# Patient Record
Sex: FEMALE | Race: BLACK OR AFRICAN AMERICAN | NOT HISPANIC OR LATINO
[De-identification: names, ages, dates, MRNs, and addresses within clinical notes are randomized per-mention and may not be internally consistent; named-entity substitution may affect disease eponyms.]

---
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
## 2018-02-15 ENCOUNTER — APPOINTMENT (OUTPATIENT)
Dept: PEDIATRIC ADOLESCENT MEDICINE | Facility: HOSPITAL | Age: 18
End: 2018-02-15

## 2018-02-15 VITALS — SYSTOLIC BLOOD PRESSURE: 117 MMHG | HEART RATE: 63 BPM | WEIGHT: 120 LBS | DIASTOLIC BLOOD PRESSURE: 73 MMHG

## 2018-02-15 DIAGNOSIS — Z11.3 ENCOUNTER FOR SCREENING FOR INFECTIONS WITH A PREDOMINANTLY SEXUAL MODE OF TRANSMISSION: ICD-10-CM

## 2018-02-15 DIAGNOSIS — Z30.09 ENCOUNTER FOR OTHER GENERAL COUNSELING AND ADVICE ON CONTRACEPTION: ICD-10-CM

## 2018-02-15 DIAGNOSIS — Z80.0 FAMILY HISTORY OF MALIGNANT NEOPLASM OF DIGESTIVE ORGANS: ICD-10-CM

## 2018-02-15 DIAGNOSIS — Z83.49 FAMILY HISTORY OF OTHER ENDOCRINE, NUTRITIONAL AND METABOLIC DISEASES: ICD-10-CM

## 2018-02-15 RX ORDER — NORGESTIMATE AND ETHINYL ESTRADIOL 7DAYSX3 28
0.18/0.215/0.25 KIT ORAL DAILY
Refills: 0 | Status: ACTIVE | COMMUNITY
Start: 2018-02-15

## 2018-03-15 ENCOUNTER — APPOINTMENT (OUTPATIENT)
Dept: DERMATOLOGY | Facility: CLINIC | Age: 18
End: 2018-03-15
Payer: OTHER GOVERNMENT

## 2018-03-15 VITALS — SYSTOLIC BLOOD PRESSURE: 106 MMHG | DIASTOLIC BLOOD PRESSURE: 70 MMHG

## 2018-03-15 PROCEDURE — 99213 OFFICE O/P EST LOW 20 MIN: CPT

## 2018-06-14 ENCOUNTER — APPOINTMENT (OUTPATIENT)
Dept: DERMATOLOGY | Facility: CLINIC | Age: 18
End: 2018-06-14
Payer: OTHER GOVERNMENT

## 2018-06-14 VITALS — DIASTOLIC BLOOD PRESSURE: 72 MMHG | SYSTOLIC BLOOD PRESSURE: 106 MMHG

## 2018-06-14 DIAGNOSIS — L81.0 POSTINFLAMMATORY HYPERPIGMENTATION: ICD-10-CM

## 2018-06-14 DIAGNOSIS — R23.8 OTHER SKIN CHANGES: ICD-10-CM

## 2018-06-14 DIAGNOSIS — L70.0 ACNE VULGARIS: ICD-10-CM

## 2018-06-14 PROCEDURE — 99213 OFFICE O/P EST LOW 20 MIN: CPT

## 2018-08-02 ENCOUNTER — APPOINTMENT (OUTPATIENT)
Dept: DERMATOLOGY | Facility: CLINIC | Age: 18
End: 2018-08-02
Payer: OTHER GOVERNMENT

## 2018-08-02 DIAGNOSIS — L30.0 NUMMULAR DERMATITIS: ICD-10-CM

## 2018-08-02 DIAGNOSIS — L98.9 DISORDER OF THE SKIN AND SUBCUTANEOUS TISSUE, UNSPECIFIED: ICD-10-CM

## 2018-08-02 PROCEDURE — 99213 OFFICE O/P EST LOW 20 MIN: CPT

## 2018-08-02 RX ORDER — MUPIROCIN 20 MG/G
2 OINTMENT TOPICAL
Qty: 1 | Refills: 2 | Status: ACTIVE | COMMUNITY
Start: 2018-08-02 | End: 1900-01-01

## 2018-08-02 RX ORDER — HALOBETASOL PROPIONATE 0.5 MG/G
0.05 OINTMENT TOPICAL
Qty: 1 | Refills: 0 | Status: ACTIVE | COMMUNITY
Start: 2018-08-02 | End: 1900-01-01

## 2018-08-27 ENCOUNTER — LABORATORY RESULT (OUTPATIENT)
Age: 18
End: 2018-08-27

## 2018-08-28 ENCOUNTER — APPOINTMENT (OUTPATIENT)
Dept: DERMATOLOGY | Facility: CLINIC | Age: 18
End: 2018-08-28
Payer: OTHER GOVERNMENT

## 2018-08-28 VITALS — DIASTOLIC BLOOD PRESSURE: 62 MMHG | SYSTOLIC BLOOD PRESSURE: 100 MMHG

## 2018-08-28 DIAGNOSIS — R22.9 LOCALIZED SWELLING, MASS AND LUMP, UNSPECIFIED: ICD-10-CM

## 2018-08-28 DIAGNOSIS — L30.9 DERMATITIS, UNSPECIFIED: ICD-10-CM

## 2018-08-28 PROCEDURE — 99213 OFFICE O/P EST LOW 20 MIN: CPT

## 2018-08-28 RX ORDER — KETOCONAZOLE 20 MG/G
2 CREAM TOPICAL
Qty: 1 | Refills: 0 | Status: ACTIVE | COMMUNITY
Start: 2018-08-28 | End: 1900-01-01

## 2018-08-29 PROBLEM — R22.9 SUBCUTANEOUS NODULE: Status: ACTIVE | Noted: 2018-08-28

## 2018-08-29 PROBLEM — L30.9 DERMATITIS: Status: ACTIVE | Noted: 2017-11-30

## 2020-10-15 ENCOUNTER — EMERGENCY (EMERGENCY)
Facility: HOSPITAL | Age: 20
LOS: 1 days | Discharge: HOME | End: 2020-10-15
Attending: STUDENT IN AN ORGANIZED HEALTH CARE EDUCATION/TRAINING PROGRAM | Admitting: STUDENT IN AN ORGANIZED HEALTH CARE EDUCATION/TRAINING PROGRAM
Payer: OTHER GOVERNMENT

## 2020-10-15 VITALS
RESPIRATION RATE: 18 BRPM | WEIGHT: 130.07 LBS | DIASTOLIC BLOOD PRESSURE: 76 MMHG | HEIGHT: 64 IN | HEART RATE: 81 BPM | TEMPERATURE: 98 F | OXYGEN SATURATION: 98 % | SYSTOLIC BLOOD PRESSURE: 115 MMHG

## 2020-10-15 VITALS
RESPIRATION RATE: 18 BRPM | SYSTOLIC BLOOD PRESSURE: 137 MMHG | OXYGEN SATURATION: 100 % | TEMPERATURE: 98 F | DIASTOLIC BLOOD PRESSURE: 86 MMHG | HEART RATE: 64 BPM

## 2020-10-15 PROCEDURE — 99283 EMERGENCY DEPT VISIT LOW MDM: CPT

## 2020-10-15 PROCEDURE — 99284 EMERGENCY DEPT VISIT MOD MDM: CPT

## 2020-10-15 NOTE — ED PROVIDER NOTE - PROGRESS NOTE DETAILS
Angela PGY2: Patient would not like drainage at this time and would rather f/u outpatient. Given f/u clinic contact by OBGYN, discussed return precautions with patient.

## 2020-10-15 NOTE — ED PROVIDER NOTE - NSFOLLOWUPINSTRUCTIONS_ED_ALL_ED_FT
- Continue all regular medications  - For pain, take tylenol or ibuprofen as directed on the packaging  - Follow up wit an OBGYN ([phone]) within 3 days  - You were given copies of labs and/or imaging results if applicable, please take them to your follow up appointments  - Return to the ER for unbearable pain, severe swelling at the site, fever, vomiting, abdominal pain or any worsening symptoms or concerns

## 2020-10-15 NOTE — ED ADULT NURSE NOTE - OBJECTIVE STATEMENT
20 year old female with a Hx of a Bartholin Cyst presents to the ED complaining of a new cyst on the right labia and a brief period of nausea this morning that resolved after eating breakfast. Pt is A&O x 4, VSS, afebrile, ambulating independently. Pt denies fever, chills, NVD, SOB, or chest pain. Pt well appearing. Vaginal exam performed with RN present throughout.

## 2020-10-15 NOTE — ED PROVIDER NOTE - PHYSICAL EXAMINATION
Physical Exam:  Gen: NAD, AOx3, non-toxic appearing, able to ambulate without assistance  Lung: no respiratory distress, speaking in full sentences  CV: RRR, no murmurs, rubs or gallops, distal pulses 2+ b/l  Abd: soft, NT, ND, no guarding, no rigidity, no rebound tenderness, no CVA tenderness   : chaperoned by Zaida SUAZO, minimal R inner labial swelling and TTP, no erythema or discharge or active bleeding  Skin: Warm, well perfused, no rash, no leg swelling  Psych: normal affect, calm

## 2020-10-15 NOTE — ED PROVIDER NOTE - ATTENDING CONTRIBUTION TO CARE
I have personally performed a face to face medical and diagnostic evaluation of the patient. I have discussed with and reviewed the Resident's note and agree with the History, ROS, Physical Exam and MDM unless otherwise indicated. A brief summary of my personal evaluation and impression can be found below.    20F prior hx of R bartholin cyst s/p repair, now presents with pain to L vaginal wall c/w prior episodes of cyst on opposite site, per triage note c/o abdominal pain a/w n/v however pt reports that she does not have abdominal pain, did have mild nausea this morning that improved after breakfast, tolerating PO otherwise, no new vaginal discharge, no rash, no fevers, currently on menstrual cycle. No meds prior to ED arrival, is at [facility], clinic was closed today due to power loss. Otherwise without complaints. Denies n//f/c/cp/sob. Denies headache, syncope, lightheadedness, dizziness. Denies chest palpitations, abdominal pain. Denies edema. Denies dysuria, hematuria, BRBPR, tarry stools, diarrhea, constipation.     All other ROS negative, except as above and as per HPI and ROS section.    VITALS: Initial triage and subsequent vitals have been reviewed by me.  GEN APPEARANCE: WDWN, alert, non-toxic, NAD  HEAD: Atraumatic.  EYES: PERRLa, EOMI, vision grossly intact.   NECK: Supple  CV: RRR, S1S2, no c/r/m/g. Cap refill <2 seconds. No bruits.   LUNGS: CTAB. No abnormal breath sounds.  ABDOMEN: Soft, NTND. No guarding or rebound.   MSK/EXT: No spinal or extremity point tenderness. No CVA ttp. Pelvis stable. No peripheral edema.  NEURO: Alert, follows commands. Weight bearing normal. Speech normal. Sensation and motor normal x4 extremities.   SKIN: Warm, dry and intact. No rash.  PSYCH: Appropriate   Exam (Female): External genitalia normal, small ~1-2cm medial R labial wall swelling, minimal fluctuance? no erythremia no purulent drainage, minimal ttp , no e/o bleeding, trauma.   EXAM WITH: Angela SANTIZO, Frannie SUAZO      Plan/MDM: 20F presents with labial wall swelling and discomfort c/w prior episodes of L bartholin cyst, this episode on R side, exam as above, area very small offered drainage however so small unlikely w significant benefit, had r/b/a discussion with pt, pt prefers to follow up in OBGYN outpt office for further management, low c/f abscess as w minimal ttp, no redness, no fevers, no abdominal pain, no other systemic sx. w dc w close obgyn follow up, strict return precautions discussed.

## 2020-10-15 NOTE — ED PROVIDER NOTE - CLINICAL SUMMARY MEDICAL DECISION MAKING FREE TEXT BOX
19yo female p/w R inner labial pain. Minimal swelling and TTP at the site. Normal vitals, afebrile. No signs of active infection or STI. Likely non-infected bartholin gland cyst. Drainage with word catheter and dc with OBGYN f/u.

## 2020-10-15 NOTE — ED PROVIDER NOTE - PATIENT PORTAL LINK FT
You can access the FollowMyHealth Patient Portal offered by [facility] by registering at the following website: [email or web address]. By joining [employer]’s FollowMyHealth portal, you will also be able to view your health information using other applications (apps) compatible with our system.

## 2020-10-16 ENCOUNTER — EMERGENCY (EMERGENCY)
Facility: HOSPITAL | Age: 20
LOS: 1 days | Discharge: HOME | End: 2020-10-16
Attending: STUDENT IN AN ORGANIZED HEALTH CARE EDUCATION/TRAINING PROGRAM
Payer: OTHER GOVERNMENT

## 2020-10-16 VITALS
RESPIRATION RATE: 20 BRPM | HEART RATE: 64 BPM | OXYGEN SATURATION: 100 % | DIASTOLIC BLOOD PRESSURE: 81 MMHG | SYSTOLIC BLOOD PRESSURE: 121 MMHG

## 2020-10-16 VITALS
OXYGEN SATURATION: 98 % | SYSTOLIC BLOOD PRESSURE: 113 MMHG | DIASTOLIC BLOOD PRESSURE: 72 MMHG | RESPIRATION RATE: 18 BRPM | HEART RATE: 71 BPM | WEIGHT: 126.99 LBS | TEMPERATURE: 98 F | HEIGHT: 64 IN

## 2020-10-16 DIAGNOSIS — N75.0 CYST OF BARTHOLIN'S GLAND: ICD-10-CM

## 2020-10-16 PROCEDURE — 56420 I&D BARTHOLINS GLAND ABSCESS: CPT | Mod: RT

## 2020-10-16 PROCEDURE — 10060 I&D ABSCESS SIMPLE/SINGLE: CPT | Mod: GC

## 2020-10-16 PROCEDURE — 99284 EMERGENCY DEPT VISIT MOD MDM: CPT | Mod: 25

## 2020-10-16 PROCEDURE — 99284 EMERGENCY DEPT VISIT MOD MDM: CPT

## 2020-10-16 NOTE — ED PROVIDER NOTE - PATIENT PORTAL LINK FT
You can access the FollowMyHealth Patient Portal offered by [facility] by registering at the following website: [email or web address]. By joining [employer]’s FollowMyHealth portal, you will also be able to view your health information using other applications (apps) compatible with our system.

## 2020-10-16 NOTE — ED ADULT TRIAGE NOTE - CHIEF COMPLAINT QUOTE
pt seen in er yesterday for complaint returns from [facility] for same complaint

## 2020-10-16 NOTE — ED PROVIDER NOTE - OBJECTIVE STATEMENT
20F with hx of left sided bartholin cyst s/p surgical repair now p/w worsening right sided vaginal pain and swelling.  Pt was seen here yesterday for same and the pain and swelling is worse. 20F with hx of left sided bartholin cyst s/p surgical repair now p/w worsening right sided vaginal pain and swelling.  Pt was seen here yesterday for same and the pain and swelling is worse. denies fevers, chills, skin erythema.

## 2020-10-16 NOTE — ED PROVIDER NOTE - NSFOLLOWUPINSTRUCTIONS_ED_ALL_ED_FT
Thank you for visiting our Emergency Department, it has been a pleasure taking part in your healthcare. Please follow up with your ob/gyn doctor next week.    Your discharge diagnosis is: bartholin's cyst    Return precautions to the Emergency Department include but are not limited to: unrelenting nausea, vomiting, fever, chills, chest pain, shortness of breath, dizziness, abdominal pain, worsening pain, syncope, blood in urine or stool, headache that doesn't resolve, numbness or tingling, loss of sensation, loss of motor function, or any other concerning symptoms. Thank you for visiting our Emergency Department, it has been a pleasure taking part in your healthcare. Please follow up with your ob/gyn doctor next week.    Your discharge diagnosis is: bartholin's cyst    Return precautions to the Emergency Department include but are not limited to: unrelenting nausea, vomiting, fever, chills, chest pain, shortness of breath, dizziness, abdominal pain, worsening pain, syncope, blood in urine or stool, headache that doesn't resolve, numbness or tingling, loss of sensation, loss of motor function, or any other concerning symptoms.    1) as told by OBGYN, followup with their office/clinic.   2) take ibuprofen/tylenol for pain.

## 2020-10-16 NOTE — ED PROVIDER NOTE - PHYSICAL EXAMINATION
Gen: WDWN, NAD  HEENT: EOMI, no nasal discharge, mucous membranes moist  CV: normal rate.   Resp: no accessory muscle use.   GI: Abdomen soft non-distended, NTTP, no masses  MSK: No open wounds, no bruising, no LE edema  Neuro: A&Ox4, following commands, moving all four extremities spontaneously  Psych: appropriate mood  Gyn: mild R labial swelling at 9 o'clock position. no erythema. + palpable mass, mild fluctuance.

## 2020-10-16 NOTE — CONSULT NOTE ADULT - ASSESSMENT
Bartholin's Cyst Incision and Drainage performed at bedside  - Consents obtained with patient after r/b/a discussed     R bartholin cyst incision and drainage  -Local anesthesia with 1% lidocaine injected at bartholin cyst site   -A small 4mm incision made into the cyst and pus/blood-like fluid draining spontaneously. More fluid expressed with gentle pressure. No word catheter placed due to size of cyst.     Pt to follow up outpatient for management and follow up.    Bartholin's Cyst Incision and Drainage performed at bedside  - Consents obtained with patient after r/b/a discussed     R bartholin cyst incision and drainage  -Local anesthesia with 1% lidocaine injected at bartholin cyst site   -A small 3mm incision made into the cyst and pus/blood-like fluid draining spontaneously. More fluid expressed with gentle pressure. No word catheter placed due to size of cyst.     Pt to follow up outpatient for management and follow up.

## 2020-10-16 NOTE — ED PROVIDER NOTE - NSFOLLOWUPCLINICS_GEN_ALL_ED_FT
[facility]  Gynceology/OB  [address] [place] [address]  Phone: [phone]  Fax:   Follow Up Time: 7-10 Days

## 2020-10-16 NOTE — ED PROVIDER NOTE - CLINICAL SUMMARY MEDICAL DECISION MAKING FREE TEXT BOX
20F with hx of left sided bartholin cyst s/p surgical repair now p/w worsening right sided vaginal pain and swelling.  Pt was seen here yesterday for same and the pain and swelling is worse.  No abd pain, f/c, dysuria.  Slight swelling at 8pm position for right labia lateral to vaginal introitus, no surrounding erythema or induration.  Will call GYN for possible drainage of bartholin's cyst and reassess.  - Kristin Worley, DO

## 2020-10-16 NOTE — ED PROVIDER NOTE - PROGRESS NOTE DETAILS
Ansari DO: pt had I&D w/ obgyn, no recommendations for abx, recommended f/u w/ obgyn clinic in 7-10 days, pt aware, spoke to pt regarding outpt f/u and return precautions, pt is [employer] and states medical officer will be picking her up, no need to arrange taxi.

## 2020-10-16 NOTE — ED PROVIDER NOTE - NS ED ROS FT
Gen: Denies fever  CV: Denies chest pain  Skin: Denies labial erythema  Resp: Denies SOB, cough  Endo: Denies increased urination  GI: Denies nausea, vomiting  Msk: Denies extremity pain  : Denies dysuria  GYN: denies vaginal discharge.   Neuro: Denies LOC  Psych: Denies mood changes

## 2020-10-16 NOTE — ED ADULT NURSE NOTE - OBJECTIVE STATEMENT
Patient reports bartholin grand cyst reoccurring, was seen yesterday in the ED, was discharged without treatment. Patient reports wanting drainage as patient received previously.

## 2020-10-16 NOTE — CONSULT NOTE ADULT - PROBLEM SELECTOR RECOMMENDATION 9
- Drained at bedside  - Home care: PO analgesia with motrin/tylenol, warm compresses, clean area daily  - Pt counseled on expected symptoms, continued drainage  - Return precautions provided: fever/chills, worsening pain, swelling  - Pt to follow up with a Ob/Gyn in 1-2 weeks   - Please provide patient with Dr. Stevens's office information for follow up    Pt seen, counseled, examined and procedure performed with Dr. Rodney Mast PGY2

## 2020-10-16 NOTE — CONSULT NOTE ADULT - SUBJECTIVE AND OBJECTIVE BOX
PATRICK CRAWFORD  20y  Female [id number]    HPI:        Name of GYN Physician:     POB:      Pgyn: Denies fibroids, cysts, endometriosis, STI's, Abnormal pap smears     Home meds:     Hospital Meds:   MEDICATIONS  (STANDING):    MEDICATIONS  (PRN):      Allergies    Drug Allergies Not Recorded  latex (Rash)    Intolerances        PAST MEDICAL & SURGICAL HISTORY:  Bartholin&#x27;s cyst    No significant past surgical history        FAMILY HISTORY:      Social History:  Denies smoking use, drug use, alcohol use.   +occasional social alcohol use    Vital Signs Last 24 Hrs  T(C): 36.7 (16 Oct 2020 13:16), Max: 36.7 (16 Oct 2020 13:16)  T(F): 98.1 (16 Oct 2020 13:16), Max: 98.1 (16 Oct 2020 13:16)  HR: 64 (16 Oct 2020 16:03) (64 - 71)  BP: 121/81 (16 Oct 2020 16:03) (113/72 - 121/81)  BP(mean): --  RR: 20 (16 Oct 2020 16:03) (18 - 20)  SpO2: 100% (16 Oct 2020 16:03) (98% - 100%)    Physical Exam:   General: sitting comftorably in bed, NAD   HEENT: neck supple, full ROM  CV: RR S1S2 no m/r/g  Lungs: CTA b/l, good air flow b/l   Back: No CVA tenderness  Abd: Soft, non-tender, non-distended.  Bowel sounds present.    :  No bleeding on pad.    External labia wnl.  Bimanual exam with no cervical motion tenderness, uterus wnl, adnexa non palpable b/l.  Cervix closed vs. Cervix dilated    cm   Speculum Exam: No active bleeding from os.  Physiologic discharge.    Ext: non-tender b/l, no edema     LABS:                I&O's Detail          RADIOLOGY & ADDITIONAL STUDIES: PATRICK CRAWFORD  20y  Female [id number]    HPI:  21yo G0 LMP 10/11 presenting with notable mass on right side c/w bartholin's gland cyst. Pt with hx of prior bartholin's gland cyst on left side s/p ?CO2 laser ablation of cyst area with no recurrence since, presenting with new onset discomfort on right side. Pt noted the cyst two days ago when removing a tampon. Describes cyst as uncomfortable, but no excruciatingly painful. Pt was seen in ED yesterday, discharged home without gyn consult for outpatient follow up. 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     Pt denies fevers/chills. Denies small amount of nausea/no vomiting. Denies CP/palpitations/SOB.     Reports regular menses. Currently on JuneL for birth control.    Name of GYN Physician: No Gyn in [place], pt originally from [place]   POB:  P0  Pgyn: Denies fibroids, cysts, endometriosis, STI's, Abnormal pap smears   Home meds: [facility] Meds: None  Allergies NKDA, latex (Rash)        PAST MEDICAL & SURGICAL HISTORY:  Bartholin&#x27;s cyst    No significant past surgical history    Vital Signs Last 24 Hrs  T(C): 36.7 (16 Oct 2020 13:16), Max: 36.7 (16 Oct 2020 13:16)  T(F): 98.1 (16 Oct 2020 13:16), Max: 98.1 (16 Oct 2020 13:16)  HR: 64 (16 Oct 2020 16:03) (64 - 71)  BP: 121/81 (16 Oct 2020 16:03) (113/72 - 121/81)  BP(mean): --  RR: 20 (16 Oct 2020 16:03) (18 - 20)  SpO2: 100% (16 Oct 2020 16:03) (98% - 100%)    Physical Exam:   General: sitting comftorably in bed, NAD   CV: RR S1S2   Lungs: CTA b/l, good air flow b/l   Abd: Soft, non-tender, non-distended.  No rebound, no guarding  :  No bleeding on pad.    External labia wnl.  2-3 cm bartholins palpated on right side at 8 o'clock position. No drainage. Tender to palpation.   Spec/Bimanual deferred.  Ext: non-tender b/l, no edema     LABS:    No lab work obtained

## 2020-10-16 NOTE — ED PROVIDER NOTE - CHIEF COMPLAINT
The patient is a 20y Female complaining of The patient is a 20y Female complaining of labial swelling.

## 2020-10-22 ENCOUNTER — TRANSCRIPTION ENCOUNTER (OUTPATIENT)
Age: 20
End: 2020-10-22

## 2020-10-23 ENCOUNTER — TRANSCRIPTION ENCOUNTER (OUTPATIENT)
Age: 20
End: 2020-10-23

## 2020-10-23 ENCOUNTER — RESULT REVIEW (OUTPATIENT)
Age: 20
End: 2020-10-23

## 2020-10-23 ENCOUNTER — INPATIENT (INPATIENT)
Facility: HOSPITAL | Age: 20
LOS: 0 days | Discharge: HOME | DRG: 747 | End: 2020-10-24
Attending: OBSTETRICS & GYNECOLOGY | Admitting: OBSTETRICS & GYNECOLOGY
Payer: COMMERCIAL

## 2020-10-23 VITALS
RESPIRATION RATE: 20 BRPM | OXYGEN SATURATION: 99 % | TEMPERATURE: 98 F | SYSTOLIC BLOOD PRESSURE: 109 MMHG | HEART RATE: 87 BPM | WEIGHT: 130.07 LBS | DIASTOLIC BLOOD PRESSURE: 61 MMHG | HEIGHT: 64 IN

## 2020-10-23 DIAGNOSIS — N75.0 CYST OF BARTHOLIN'S GLAND: ICD-10-CM

## 2020-10-23 LAB
ALBUMIN SERPL ELPH-MCNC: 4.3 G/DL — SIGNIFICANT CHANGE UP (ref 3.3–5)
ALP SERPL-CCNC: 63 U/L — SIGNIFICANT CHANGE UP (ref 40–120)
ALT FLD-CCNC: 13 U/L — SIGNIFICANT CHANGE UP (ref 10–45)
ANION GAP SERPL CALC-SCNC: 8 MMOL/L — SIGNIFICANT CHANGE UP (ref 5–17)
APTT BLD: 24.7 SEC — LOW (ref 27.5–35.5)
AST SERPL-CCNC: 18 U/L — SIGNIFICANT CHANGE UP (ref 10–40)
BASOPHILS # BLD AUTO: 0.06 K/UL — SIGNIFICANT CHANGE UP (ref 0–0.2)
BASOPHILS NFR BLD AUTO: 0.5 % — SIGNIFICANT CHANGE UP (ref 0–2)
BILIRUB SERPL-MCNC: 0.2 MG/DL — SIGNIFICANT CHANGE UP (ref 0.2–1.2)
BUN SERPL-MCNC: 9 MG/DL — SIGNIFICANT CHANGE UP (ref 7–23)
CALCIUM SERPL-MCNC: 9.3 MG/DL — SIGNIFICANT CHANGE UP (ref 8.4–10.5)
CHLORIDE SERPL-SCNC: 106 MMOL/L — SIGNIFICANT CHANGE UP (ref 96–108)
CO2 SERPL-SCNC: 25 MMOL/L — SIGNIFICANT CHANGE UP (ref 22–31)
CREAT SERPL-MCNC: 0.6 MG/DL — SIGNIFICANT CHANGE UP (ref 0.5–1.3)
EOSINOPHIL # BLD AUTO: 0.06 K/UL — SIGNIFICANT CHANGE UP (ref 0–0.5)
EOSINOPHIL NFR BLD AUTO: 0.5 % — SIGNIFICANT CHANGE UP (ref 0–6)
GLUCOSE SERPL-MCNC: 86 MG/DL — SIGNIFICANT CHANGE UP (ref 70–99)
HCT VFR BLD CALC: 36.4 % — SIGNIFICANT CHANGE UP (ref 34.5–45)
HGB BLD-MCNC: 12 G/DL — SIGNIFICANT CHANGE UP (ref 11.5–15.5)
IMM GRANULOCYTES NFR BLD AUTO: 0.4 % — SIGNIFICANT CHANGE UP (ref 0–1.5)
INR BLD: 0.83 RATIO — LOW (ref 0.88–1.16)
LYMPHOCYTES # BLD AUTO: 2.84 K/UL — SIGNIFICANT CHANGE UP (ref 1–3.3)
LYMPHOCYTES # BLD AUTO: 22.9 % — SIGNIFICANT CHANGE UP (ref 13–44)
MCHC RBC-ENTMCNC: 29.4 PG — SIGNIFICANT CHANGE UP (ref 27–34)
MCHC RBC-ENTMCNC: 33 GM/DL — SIGNIFICANT CHANGE UP (ref 32–36)
MCV RBC AUTO: 89.2 FL — SIGNIFICANT CHANGE UP (ref 80–100)
MONOCYTES # BLD AUTO: 1 K/UL — HIGH (ref 0–0.9)
MONOCYTES NFR BLD AUTO: 8.1 % — SIGNIFICANT CHANGE UP (ref 2–14)
NEUTROPHILS # BLD AUTO: 8.38 K/UL — HIGH (ref 1.8–7.4)
NEUTROPHILS NFR BLD AUTO: 67.6 % — SIGNIFICANT CHANGE UP (ref 43–77)
NRBC # BLD: 0 /100 WBCS — SIGNIFICANT CHANGE UP (ref 0–0)
PLATELET # BLD AUTO: 366 K/UL — SIGNIFICANT CHANGE UP (ref 150–400)
POTASSIUM SERPL-MCNC: 4.2 MMOL/L — SIGNIFICANT CHANGE UP (ref 3.5–5.3)
POTASSIUM SERPL-SCNC: 4.2 MMOL/L — SIGNIFICANT CHANGE UP (ref 3.5–5.3)
PROT SERPL-MCNC: 6.9 G/DL — SIGNIFICANT CHANGE UP (ref 6–8.3)
PROTHROM AB SERPL-ACNC: 10 SEC — LOW (ref 10.6–13.6)
RBC # BLD: 4.08 M/UL — SIGNIFICANT CHANGE UP (ref 3.8–5.2)
RBC # FLD: 11.9 % — SIGNIFICANT CHANGE UP (ref 10.3–14.5)
SARS-COV-2 RNA SPEC QL NAA+PROBE: SIGNIFICANT CHANGE UP
SODIUM SERPL-SCNC: 139 MMOL/L — SIGNIFICANT CHANGE UP (ref 135–145)
WBC # BLD: 12.39 K/UL — HIGH (ref 3.8–10.5)
WBC # FLD AUTO: 12.39 K/UL — HIGH (ref 3.8–10.5)

## 2020-10-23 PROCEDURE — 99285 EMERGENCY DEPT VISIT HI MDM: CPT

## 2020-10-23 PROCEDURE — ZZZZZ: CPT

## 2020-10-23 PROCEDURE — 93010 ELECTROCARDIOGRAM REPORT: CPT

## 2020-10-23 PROCEDURE — 56740 EXC BARTHOLINS GLAND/CYST: CPT | Mod: 79

## 2020-10-23 PROCEDURE — 88304 TISSUE EXAM BY PATHOLOGIST: CPT | Mod: 26

## 2020-10-23 RX ORDER — ACETAMINOPHEN 500 MG
975 TABLET ORAL ONCE
Refills: 0 | Status: COMPLETED | OUTPATIENT
Start: 2020-10-23 | End: 2020-10-23

## 2020-10-23 RX ORDER — IBUPROFEN 200 MG
1 TABLET ORAL
Qty: 0 | Refills: 0 | DISCHARGE
Start: 2020-10-23

## 2020-10-23 RX ORDER — IBUPROFEN 200 MG
600 TABLET ORAL ONCE
Refills: 0 | Status: COMPLETED | OUTPATIENT
Start: 2020-10-23 | End: 2020-10-23

## 2020-10-23 RX ORDER — ACETAMINOPHEN 500 MG
650 TABLET ORAL ONCE
Refills: 0 | Status: COMPLETED | OUTPATIENT
Start: 2020-10-23 | End: 2020-10-23

## 2020-10-23 RX ORDER — ACETAMINOPHEN 500 MG
3 TABLET ORAL
Qty: 0 | Refills: 0 | DISCHARGE
Start: 2020-10-23

## 2020-10-23 RX ORDER — HYDROMORPHONE HYDROCHLORIDE 2 MG/ML
1 INJECTION INTRAMUSCULAR; INTRAVENOUS; SUBCUTANEOUS ONCE
Refills: 0 | Status: DISCONTINUED | OUTPATIENT
Start: 2020-10-23 | End: 2020-10-24

## 2020-10-23 RX ORDER — SODIUM BICARBONATE 1 MEQ/ML
3 SYRINGE (ML) INTRAVENOUS ONCE
Refills: 0 | Status: DISCONTINUED | OUTPATIENT
Start: 2020-10-23 | End: 2020-10-24

## 2020-10-23 RX ORDER — HYDROMORPHONE HYDROCHLORIDE 2 MG/ML
0.25 INJECTION INTRAMUSCULAR; INTRAVENOUS; SUBCUTANEOUS
Refills: 0 | Status: DISCONTINUED | OUTPATIENT
Start: 2020-10-23 | End: 2020-10-24

## 2020-10-23 RX ORDER — ONDANSETRON 8 MG/1
4 TABLET, FILM COATED ORAL ONCE
Refills: 0 | Status: DISCONTINUED | OUTPATIENT
Start: 2020-10-23 | End: 2020-10-24

## 2020-10-23 RX ORDER — LIDOCAINE HYDROCHLORIDE AND EPINEPHRINE 10; 10 MG/ML; UG/ML
30 INJECTION, SOLUTION INFILTRATION; PERINEURAL ONCE
Refills: 0 | Status: DISCONTINUED | OUTPATIENT
Start: 2020-10-23 | End: 2020-10-24

## 2020-10-23 RX ORDER — IBUPROFEN 200 MG
600 TABLET ORAL ONCE
Refills: 0 | Status: COMPLETED | OUTPATIENT
Start: 2020-10-23 | End: 2020-10-24

## 2020-10-23 RX ORDER — HYDROMORPHONE HYDROCHLORIDE 2 MG/ML
1 INJECTION INTRAMUSCULAR; INTRAVENOUS; SUBCUTANEOUS ONCE
Refills: 0 | Status: DISCONTINUED | OUTPATIENT
Start: 2020-10-23 | End: 2020-10-23

## 2020-10-23 RX ORDER — SODIUM CHLORIDE 9 MG/ML
1000 INJECTION, SOLUTION INTRAVENOUS
Refills: 0 | Status: DISCONTINUED | OUTPATIENT
Start: 2020-10-23 | End: 2020-10-24

## 2020-10-23 RX ADMIN — SODIUM CHLORIDE 125 MILLILITER(S): 9 INJECTION, SOLUTION INTRAVENOUS at 22:00

## 2020-10-23 RX ADMIN — Medication 650 MILLIGRAM(S): at 09:56

## 2020-10-23 RX ADMIN — Medication 975 MILLIGRAM(S): at 23:20

## 2020-10-23 RX ADMIN — SODIUM CHLORIDE 125 MILLILITER(S): 9 INJECTION, SOLUTION INTRAVENOUS at 13:10

## 2020-10-23 RX ADMIN — Medication 600 MILLIGRAM(S): at 09:55

## 2020-10-23 RX ADMIN — Medication 975 MILLIGRAM(S): at 23:04

## 2020-10-23 NOTE — ED PROVIDER NOTE - PHYSICAL EXAMINATION
CONSTITUTIONAL: Nontoxic, well nourished, well developed, young female  HEAD: Normocephalic; atraumatic  EYES: Normal inspection, EOMI  ENMT: External appears normal; normal oropharynx  NECK: Supple; non-tender; no cervical lymphadenopathy  CARD: RRR; no audible murmurs, rubs, or gallops  RESP: No respiratory distress, lungs ctab/l  ABD: Soft, non-distended; non-tender; no rebound or guarding  EXT: No LE pitting edema or calf tenderness; distal pulses intact with good capillary refill  SKIN: Warm, dry, intact  NEURO: aaox3, moving all extremities spontaneously   : External genitalia unremarkable. 1.5cm hard round mass in R lower vulva area on palpation

## 2020-10-23 NOTE — ED PROVIDER NOTE - PROGRESS NOTE DETAILS
Poornima Newell MD: PT evaluated by GYN at bedside, plan for marsupialization. Will be admitted to OBGYN

## 2020-10-23 NOTE — ED ADULT TRIAGE NOTE - PAIN RATING/NUMBER SCALE (0-10): ACTIVITY
Called mother and discussed that sheng increase keflex to 500 mg 4 times a day and May need  obtain culture from drainage if present .  Mother will call us with progress.   5

## 2020-10-23 NOTE — DISCHARGE NOTE PROVIDER - NSDCMRMEDTOKEN_GEN_ALL_CORE_FT
acetaminophen 325 mg oral tablet: 3 tab(s) orally once, As needed, Mild Pain (1 - 3)  ibuprofen 600 mg oral tablet: 1 tab(s) orally once, As needed, Mild Pain (1 - 3)

## 2020-10-23 NOTE — CHART NOTE - NSCHARTNOTEFT_GEN_A_CORE
Pt with recurrent right Bartholin gland cyst/abscess.  Pt for Bartholin cyst removal or possible marsupialization (if cyst currently infectec) in the OR.    Pt understands risks/benefits of surgery including but not limited to bleeding, infection, injury to bowel/bladder, nerve damage, cyst recurrence and even death.  Pt has verbalized understanding of the above and has signed informed consent.    LONNIE Villafuerte

## 2020-10-23 NOTE — H&P ADULT - HISTORY OF PRESENT ILLNESS
HPI:  19yo G0 presents to ED due to pain and induration in location of previously I&D bartholin's cyst abscess on 10/16 in [facility] ED.  At that time, pt underwent uncomplicated drainage of R sided bartholin's gland abscess.  Pt notes that 3 days later the abscess reoccurred and is now similar size.  She reports discomfort with walking and palpation.  Pt notes she has college exams approaching and is worried discomfort will affect her school work. she tried to make an outpt gyn appt but the earliest was not for a few weeks with [facility].     Pt has a history of bartholin's gland cysts in the past on the left side which was treated in [place] via CO2 laser ablation, pt reports she saw a Bartholin's specialist at that time.       Name of GYN Physician: [facility]     Pgyn: Denies fibroids, cysts, STI's, Abnormal pap smears, bartholin's as in HPI  PMH: denies  PSH:CO2 laser ablation    Home meds: Junel

## 2020-10-23 NOTE — ED ADULT NURSE REASSESSMENT NOTE - NS ED NURSE REASSESS COMMENT FT1
Report received from Shanna SUAZO. Pt resting comfortably without complaints & does not appear to be in any acute distress at this time with VSS & family at bedside. Pt aware that she is awaiting bed on OBGYN floor & going to OR.

## 2020-10-23 NOTE — H&P ADULT - ASSESSMENT
19yo G0 presents to ED due to pain and induration in location of previously I&D bartholin's cyst abscess on 10/16 in [facility] ED.  Now with recurrent Bartholin's gland abscess.   Pt counseled at bedside regarding management of bartholin's.  Since abscess is current, recommend marsupialization for permanent solution.  counseled on possibility of recurrence despite this procedure however pt reports she would like to proceed with marsupialization.  - admit to gyn  - NPO, IV insert, LR @ 125  - add on to OR, consents signed    Luann Castillo PGY4  seen w Dr Barry

## 2020-10-23 NOTE — ED PROVIDER NOTE - OBJECTIVE STATEMENT
Poornima Newell MD: 19yo with hx of multiple bartholin's gland cysts (L side s/p laser ablation) presenting with worsening mass on R side. Pt had bartholin's cyst on the R I&D 1 week ago in the ED, but pt states that she had noticed worsening swelling and pain in that same area. Denies fever, chills. LMP about 1 week ago.

## 2020-10-23 NOTE — H&P ADULT - ATTENDING COMMENTS
Pt seen in ER w recurrent right Bartholins gland abscess.  Pt is S/P I&D one wk ago w reaccumulation of abscess.  On exam 2cm left Bartholins abscess noted w no surrounding erythema.  Discussed mgt options w pt.  Abscess is too small for Word catheter placement.  Discussed I&D vs marsupialization.  Pt desires definitive treatment w marsupialization.  Pt is aware of risks of recurrence.  Discussed risks of procedure including but not limited to risks of anesthesia, bleeding, infection and injury to adjacent tissues.  All questions answered.  Consent signed.

## 2020-10-23 NOTE — ED ADULT NURSE NOTE - OBJECTIVE STATEMENT
Pt is a 20y F PMH bartholin's gland cysts s/p laser ablation on L p/w worsening pain and mass on R side. Pt had I&D on R 1 week ago in the ED but has since noticed worsening pain and swelling. Pain worse with walking and sitting. Pt concerned with upcoming exams in school that she needs to have it drained again. No drainage or discharge noted. Denies fevers, chills, flu-like symptoms, pain elsewhere, urinary changes. One side rail up for safety, call bell and personal items within reach, instructed to call for assistance, verbalizes understanding. Will continue to monitor.

## 2020-10-23 NOTE — DISCHARGE NOTE PROVIDER - NSDCFUADDINST_GEN_ALL_CORE_FT
Follow up with the [facility]. Take pain medications as instructed. Call your doctor if you experience fevers >100.4, pain uncontrolled with medications, heavy vaginal bleeding, or inability to void.

## 2020-10-23 NOTE — ED PROVIDER NOTE - ATTENDING CONTRIBUTION TO CARE
------------ATTENDING NOTE------------  pt w/ partner c/o several days of increasing swelling and constant mild/moderate ache in R lower labia, feels this is a forming Bartholin Cyst/Abscess, no fevers, no discharge, pain worse w/ touching area/physical activity, pt describes multiple recurrent past L Bartholin Cyst/Abscess and I&D and CO2 Laser Tx, otherwise well appearing -->  - Ayush Soria MD   ---------------------------------------------

## 2020-10-23 NOTE — CONSULT NOTE ADULT - SUBJECTIVE AND OBJECTIVE BOX
PATRICK CRAWFORD  20y  Female [id number]    HPI:  19yo G0 presents to ED due to pain and induration in location of previously I&D bartholin's cyst abscess on 10/16 in [facility] ED.  At that time, pt underwent uncomplicated drainage of R sided bartholin's gland abscess.    Pt has a history of bartholin's gland cysts in the past on the left side       Name of GYN Physician: [facility]   Pgyn: Denies fibroids, cysts, STI's, Abnormal pap smears   PMH:  PSH:    Home meds:     Hospital Meds:   MEDICATIONS  (STANDING):  acetaminophen   Tablet .. 650 milliGRAM(s) Oral once  ibuprofen  Tablet. 600 milliGRAM(s) Oral Once    MEDICATIONS  (PRN):      Social History:  Denies smoking use, drug use, alcohol use.     Vital Signs Last 24 Hrs  T(C): 36.7 (23 Oct 2020 08:43), Max: 36.7 (23 Oct 2020 08:43)  T(F): 98 (23 Oct 2020 08:43), Max: 98 (23 Oct 2020 08:43)  HR: 87 (23 Oct 2020 08:43) (87 - 87)  BP: 109/61 (23 Oct 2020 08:43) (109/61 - 109/61)  BP(mean): --  RR: 20 (23 Oct 2020 08:43) (20 - 20)  SpO2: 99% (23 Oct 2020 08:43) (99% - 99%)    Physical Exam:   General: sitting comfortably in bed, NAD   Abd:   Ext:      LABS:              I&O's Detail

## 2020-10-23 NOTE — DISCHARGE NOTE PROVIDER - HOSPITAL COURSE
Patient was admitted with Bartholin's gland abscess s/p multiple I&D's. Marsupialization of the gland was performed and tolerated well. Patient was then discharged with normal vitals, able to ambulate, tolerate regular diet, and void spontaneously.   Patient was admitted with Bartholin's gland cyst s/p multiple I&D's. Patient s/p Bartholin gland cyst incision (). Patient had uncomplicated PACU course and was discharged with normal vitals, ambulating, tolerate regular diet, and void spontaneously.

## 2020-10-23 NOTE — DISCHARGE NOTE PROVIDER - NSDCCPTREATMENT_GEN_ALL_CORE_FT
PRINCIPAL PROCEDURE  Procedure: Marsupialization of Bartholin's gland  Findings and Treatment:

## 2020-10-23 NOTE — BRIEF OPERATIVE NOTE - OPERATION/FINDINGS
EUA: normal sized retroverted uterus; right sided 2x2cm bartholin cyst otherwise normal external genitalia  Bartholin cyst excised and capsule sent to pathology - capsule with purulent/bloody discharge  Good hemostasis at conclusion of case

## 2020-10-23 NOTE — DISCHARGE NOTE PROVIDER - NSDCCPCAREPLAN_GEN_ALL_CORE_FT
PRINCIPAL DISCHARGE DIAGNOSIS  Diagnosis: Bartholin's cyst  Assessment and Plan of Treatment:

## 2020-10-23 NOTE — ED ADULT NURSE NOTE - CHPI ED NUR SYMPTOMS NEG
no nausea/no vaginal discharge/no vomiting/no abdominal pain/no back pain/no coffee grounds emesis/no discharge/no fever

## 2020-10-23 NOTE — DISCHARGE NOTE PROVIDER - CARE PROVIDER_API CALL
[facility],   [address] [address]  Phone: [phone]  Fax: (   )    -  Follow Up Time:

## 2020-10-23 NOTE — ED ADULT NURSE NOTE - NSIMPLEMENTINTERV_GEN_ALL_ED
Implemented All Universal Safety Interventions:  [place] to call system. Call bell, personal items and telephone within reach. Instruct patient to call for assistance. Room bathroom lighting operational. Non-slip footwear when patient is off stretcher. Physically safe environment: no spills, clutter or unnecessary equipment. Stretcher in lowest position, wheels locked, appropriate side rails in place.

## 2020-10-24 ENCOUNTER — TRANSCRIPTION ENCOUNTER (OUTPATIENT)
Age: 20
End: 2020-10-24

## 2020-10-24 VITALS
RESPIRATION RATE: 18 BRPM | DIASTOLIC BLOOD PRESSURE: 62 MMHG | TEMPERATURE: 99 F | SYSTOLIC BLOOD PRESSURE: 112 MMHG | HEART RATE: 75 BPM | OXYGEN SATURATION: 97 %

## 2020-10-24 DIAGNOSIS — N75.0 CYST OF BARTHOLIN'S GLAND: ICD-10-CM

## 2020-10-24 PROCEDURE — 80053 COMPREHEN METABOLIC PANEL: CPT

## 2020-10-24 PROCEDURE — 99285 EMERGENCY DEPT VISIT HI MDM: CPT | Mod: 25

## 2020-10-24 PROCEDURE — 85025 COMPLETE CBC W/AUTO DIFF WBC: CPT

## 2020-10-24 PROCEDURE — 86850 RBC ANTIBODY SCREEN: CPT

## 2020-10-24 PROCEDURE — U0003: CPT

## 2020-10-24 PROCEDURE — 93005 ELECTROCARDIOGRAM TRACING: CPT

## 2020-10-24 PROCEDURE — 88304 TISSUE EXAM BY PATHOLOGIST: CPT

## 2020-10-24 PROCEDURE — 86900 BLOOD TYPING SEROLOGIC ABO: CPT

## 2020-10-24 PROCEDURE — 85610 PROTHROMBIN TIME: CPT

## 2020-10-24 PROCEDURE — 86901 BLOOD TYPING SEROLOGIC RH(D): CPT

## 2020-10-24 PROCEDURE — 85730 THROMBOPLASTIN TIME PARTIAL: CPT

## 2020-10-24 RX ORDER — SODIUM CHLORIDE 9 MG/ML
3 INJECTION INTRAMUSCULAR; INTRAVENOUS; SUBCUTANEOUS EVERY 8 HOURS
Refills: 0 | Status: DISCONTINUED | OUTPATIENT
Start: 2020-10-24 | End: 2020-10-24

## 2020-10-24 RX ORDER — INFLUENZA VIRUS VACCINE 15; 15; 15; 15 UG/.5ML; UG/.5ML; UG/.5ML; UG/.5ML
0.5 SUSPENSION INTRAMUSCULAR ONCE
Refills: 0 | Status: DISCONTINUED | OUTPATIENT
Start: 2020-10-24 | End: 2020-10-24

## 2020-10-24 RX ORDER — IBUPROFEN 200 MG
600 TABLET ORAL EVERY 6 HOURS
Refills: 0 | Status: DISCONTINUED | OUTPATIENT
Start: 2020-10-24 | End: 2020-10-24

## 2020-10-24 RX ORDER — ACETAMINOPHEN 500 MG
650 TABLET ORAL EVERY 6 HOURS
Refills: 0 | Status: DISCONTINUED | OUTPATIENT
Start: 2020-10-24 | End: 2020-10-24

## 2020-10-24 RX ADMIN — Medication 600 MILLIGRAM(S): at 02:23

## 2020-10-24 NOTE — PROGRESS NOTE ADULT - ASSESSMENT
20y female POD#1 s/p excision of Bartholin gland after presenting to the [facility] ED with a Bartholin's gland abscess. Patient is stable, meeting all appropriate postoperative milestones and recovering well.

## 2020-10-24 NOTE — DISCHARGE NOTE NURSING/CASE MANAGEMENT/SOCIAL WORK - PATIENT PORTAL LINK FT
You can access the FollowMyHealth Patient Portal offered by [facility] by registering at the following website: [email or web address]. By joining [facility]’s FollowMyHealth portal, you will also be able to view your health information using other applications (apps) compatible with our system.

## 2020-10-24 NOTE — CHART NOTE - NSCHARTNOTEFT_GEN_A_CORE
Patient seen and examined at bedside, recently post-op. No acute complaints at this time. Denies CP, SOB, N/V, fevers, and chills.    Vital Signs Last 24 Hours  T(C): 37.7 (10-24-20 @ 00:04), Max: 37.7 (10-24-20 @ 00:04)  HR: 95 (10-24-20 @ 00:04) (68 - 106)  BP: 95/53 (10-24-20 @ 00:04) (95/53 - 136/74)  RR: 17 (10-24-20 @ 00:04) (16 - 20)  SpO2: 98% (10-24-20 @ 00:04) (98% - 100%)    I&O's Summary    23 Oct 2020 07:01  -  24 Oct 2020 00:33  --------------------------------------------------------  IN: 979 mL / OUT: 425 mL / NET: 554 mL        Physical Exam:  General: NAD  CV: RR  Lungs: unlabored on RA  Abdomen: Soft, nontender, non-distended  : no vaginal bleeding  Ext: No pain or swelling    Labs:             12.0   12.39<H> )-----------( 366      ( 10-23 @ 11:43 )             36.4         MEDICATIONS  (STANDING):  HYDROmorphone  Injectable 1 milliGRAM(s) IV Push once  lactated ringers. 1000 milliLiter(s) (125 mL/Hr) IV Continuous <Continuous>  lidocaine 1%/epinephrine 1:100,000 Inj 30 milliLiter(s) Local Injection once  sodium bicarbonate  Injectable 3 milliLiter(s) IV Push once    MEDICATIONS  (PRN):  ibuprofen  Tablet. 600 milliGRAM(s) Oral once PRN Mild Pain (1 - 3)      19yo s/p Bartholin Cyst Excision recovering well in acute post-operative state.    - Tylenol/Motrin for Pain  - Reg Diet, LR@125  - s/p void  - Discharge order in    MILLER Miner, PGY-2

## 2020-10-24 NOTE — PROGRESS NOTE ADULT - SUBJECTIVE AND OBJECTIVE BOX
Gyn Progress Note POD#1 HD#2    Subjective:   Pt seen and examined at bedside. No acute events overnight. Pain well controlled. Patient ambulating. Passing flatus. Tolerating regular diet (crackers and sips in PACU).  Pt denies fever, chills, chest pain, SOB, nausea, vomiting, lightheadedness, dizziness.      Objective:  MEDICATIONS  (STANDING):  influenza   Vaccine 0.5 milliLiter(s) IntraMuscular once  sodium chloride 0.9% lock flush 3 milliLiter(s) IV Push every 8 hours      T(F): 98.7 (10-24-20 @ 06:04), Max: 99.9 (10-24-20 @ 00:04)  HR: 75 (10-24-20 @ 06:04) (68 - 106)  BP: 112/62 (10-24-20 @ 06:04) (95/53 - 136/74)  RR: 18 (10-24-20 @ 06:04) (16 - 20)  SpO2: 97% (10-24-20 @ 06:04) (96% - 100%)      Physical Exam:  Constitutional: NAD, A+O x3  CV: RRR  Lungs: Clear to auscultation bilaterally  Abdomen: Soft, nondistended, no guarding or rebound tenderness. Normal bowel sounds  : Scant dark red blood on external vagina, light bleeding on pad  Extremities: No lower extremity edema or calf tenderness bilaterally; venodynes in place    LABS:  10-23  139    |  106    |  9      ----------------------------<  86     4.2     |  25     |  0.60     Ca    9.3        23 Oct 2020 11:43  TPro  6.9    /  Alb  4.3    /  TBili  0.2    /  DBili  x      /  AST  18     /  ALT  13     /  AlkPhos  63     10-23  PT/INR - ( 23 Oct 2020 11:43 )   PT: 10.0 sec;   INR: 0.83 ratio   PTT - ( 23 Oct 2020 11:43 )  PTT:24.7 sec

## 2020-10-30 ENCOUNTER — APPOINTMENT (OUTPATIENT)
Dept: OBGYN | Facility: CLINIC | Age: 20
End: 2020-10-30
Payer: OTHER GOVERNMENT

## 2020-10-30 VITALS
HEART RATE: 63 BPM | WEIGHT: 129.44 LBS | DIASTOLIC BLOOD PRESSURE: 76 MMHG | BODY MASS INDEX: 20.8 KG/M2 | SYSTOLIC BLOOD PRESSURE: 112 MMHG | TEMPERATURE: 98.8 F | HEIGHT: 66 IN

## 2020-10-30 PROCEDURE — 99212 OFFICE O/P EST SF 10 MIN: CPT

## 2020-10-30 PROCEDURE — 99072 ADDL SUPL MATRL&STAF TM PHE: CPT

## 2020-10-30 NOTE — HISTORY OF PRESENT ILLNESS
[FreeTextEntry1] : 19 y/o  LMP 10/181/20 s/p right bartholin cyst removal one week ago.  Pt was concerned about the pain and wanted to be examined.\par Pt also wanted to talk about birth control.  Pt is on Junel Fe 24 and wants to try IUD\par \par Pt with no GI/ issues\par \par PMH - hx of recurrent bartholin cysts bilaterally\par PSH - lasik; \par POb - not sig\par Meds - junel\par SH - occ ETHOH; no drugs; no smke\par

## 2020-10-30 NOTE — DISCUSSION/SUMMARY
[FreeTextEntry1] : A/P Pt with nl post-op healing after excision of right bartholin cyst.  Pt in stable condition.\par Also discussed IUD and types of IUDs and risks/benefits of IUDS x 15 min\par Path from surgery - benign\par Will\par \par 1) Pt to cont with sitz baths q day\par 2) Excused from school exams for next week\par 3) Cleared for physical activity by January\par 4) Pt to think about IUD but is leaning towards Kyleena - pt to call me about it\par 5) RTC one year or prn\par \par LONNIE Villafuerte

## 2020-10-30 NOTE — PHYSICAL EXAM
[Appropriately responsive] : appropriately responsive [Alert] : alert [No Acute Distress] : no acute distress [Soft] : soft [Non-tender] : non-tender [Non-distended] : non-distended [No HSM] : No HSM [No Lesions] : no lesions [No Mass] : no mass [Oriented x3] : oriented x3 [Labia Majora] : normal [Labia Minora] : normal [Normal] :  normal [Pink Rugae] : pink rugae [Tenderness] : tenderness [No Bleeding] : There was no active vaginal bleeding [FreeTextEntry4] : Vagina on right side healing well/no puurlence noted/sl tender/+sutures still present/?small area of fibrosis deep under mucosa

## 2020-11-13 ENCOUNTER — APPOINTMENT (OUTPATIENT)
Dept: OBGYN | Facility: CLINIC | Age: 20
End: 2020-11-13
Payer: OTHER GOVERNMENT

## 2020-11-13 VITALS
BODY MASS INDEX: 20.89 KG/M2 | SYSTOLIC BLOOD PRESSURE: 115 MMHG | HEART RATE: 75 BPM | TEMPERATURE: 98 F | DIASTOLIC BLOOD PRESSURE: 72 MMHG | WEIGHT: 130 LBS | HEIGHT: 66 IN

## 2020-11-13 DIAGNOSIS — Z01.419 ENCOUNTER FOR GYNECOLOGICAL EXAMINATION (GENERAL) (ROUTINE) W/OUT ABNORMAL FINDINGS: ICD-10-CM

## 2020-11-13 PROCEDURE — 58300 INSERT INTRAUTERINE DEVICE: CPT

## 2020-11-13 PROCEDURE — 99395 PREV VISIT EST AGE 18-39: CPT | Mod: 25

## 2020-11-13 NOTE — DISCUSSION/SUMMARY
[FreeTextEntry1] : A/P Pt with nl Gyn Exam.  Pt in stable condition.  \par \par Will:\par \par 1) PAP, gc, chl done\par 2) Kyleena placed\par 3) Sutures from previous surgery removed\par 4) RTC in 2-3 mos to check IUD placement\par \par LONNIE Villafuerte

## 2020-11-13 NOTE — HISTORY OF PRESENT ILLNESS
[FreeTextEntry1] : 21 y/o  LMP 10/13/20 here for annual visit and for IUD placement.  Pt with recent bartholin cyst removal 2-3 weeks ago.  Pt with no GI/ issues and post-surgical pain has improved.\par \par PMH - hx of recurrent bartholin cysts bilaterally\par PSH - lasik; \par POb - not sig\par Meds - junel\par SH - occ ETHOH; no drugs; no smke

## 2020-11-13 NOTE — PHYSICAL EXAM
[Appropriately responsive] : appropriately responsive [Alert] : alert [No Acute Distress] : no acute distress [No Lymphadenopathy] : no lymphadenopathy [Regular Rate Rhythm] : regular rate rhythm [No Murmurs] : no murmurs [Clear to Auscultation B/L] : clear to auscultation bilaterally [Soft] : soft [Non-tender] : non-tender [Non-distended] : non-distended [No HSM] : No HSM [No Lesions] : no lesions [No Mass] : no mass [Oriented x3] : oriented x3 [Labia Majora] : normal [Labia Minora] : normal [Pink Rugae] : pink rugae [Normal] : normal [Normal Position] : in a normal position [Tenderness] : nontender [Enlarged ___ wks] : not enlarged [Mass ___ cm] : no uterine mass was palpated [Uterine Adnexae] : normal [FreeTextEntry4] : Healing well/+sutures still in place

## 2020-11-13 NOTE — PROCEDURE
[IUD Placement] : intrauterine device (IUD) placement [Prevention of Pregnancy] : prevention of pregnancy [Risks] : risks [Benefits] : benefits [Alternatives] : alternatives [Patient] : patient [Infection] : infection [Bleeding] : bleeding [Pain] : pain [Expulsion] : expulsion [Failure] : failure [Uterine Perforation] : uterine perforation [No Premedication] : No premedication [Tenaculum] : Tenaculum [Easy Passage] : Easy passage [Kyleena IUD] : Kyleena IUD [Tolerated Well] : Patient tolerated the procedure well [No Complications] : No complications [None] : None [de-identified] : [id number] [de-identified] : 2/22

## 2020-11-19 ENCOUNTER — NON-APPOINTMENT (OUTPATIENT)
Age: 20
End: 2020-11-19

## 2021-03-12 ENCOUNTER — APPOINTMENT (OUTPATIENT)
Dept: OBGYN | Facility: CLINIC | Age: 21
End: 2021-03-12
Payer: SELF-PAY

## 2021-03-12 VITALS
HEIGHT: 66 IN | SYSTOLIC BLOOD PRESSURE: 118 MMHG | DIASTOLIC BLOOD PRESSURE: 83 MMHG | WEIGHT: 137 LBS | HEART RATE: 65 BPM | BODY MASS INDEX: 22.02 KG/M2

## 2021-03-12 LAB
C TRACH RRNA SPEC QL NAA+PROBE: NOT DETECTED
CYTOLOGY CVX/VAG DOC THIN PREP: NORMAL
N GONORRHOEA RRNA SPEC QL NAA+PROBE: NOT DETECTED
SOURCE AMPLIFICATION: NORMAL

## 2021-03-12 PROCEDURE — 99212 OFFICE O/P EST SF 10 MIN: CPT

## 2021-03-12 PROCEDURE — 99072 ADDL SUPL MATRL&STAF TM PHE: CPT

## 2021-03-12 NOTE — PHYSICAL EXAM
[Labia Majora] : normal [Labia Minora] : normal [No Bleeding] : There was no active vaginal bleeding [IUD String] : an IUD string was noted [Normal] : normal [Tenderness] : nontender [Enlarged ___ wks] : not enlarged [Anteversion] : anteverted [Mass ___ cm] : no uterine mass was palpated [Uterine Adnexae] : normal [FreeTextEntry2] : Right labia healed from removal of Bartholin cyst - no pain or fibrosis felt.

## 2021-03-12 NOTE — DISCUSSION/SUMMARY
[FreeTextEntry1] : A/P Pt with nl Gyn exam.  +IUD string noted on exam.  Labia is WNL.  Pt in stable condition.\par \par Will\par 1) Pt to cont with IUD\par 2) RTC one year or prn for annual exam.\par \par LONNIE Villafuerte

## 2021-03-12 NOTE — HISTORY OF PRESENT ILLNESS
[FreeTextEntry1] : 22 y/o LMP 3/9/21 S/P Kyleena IUD placement 11/2020.  Pt here for F/U and to check IUD strings.\par Pt with no complaints.  Pt getting periods q month.  Periods are about 3-5 days, mild cramps.\par No other GI/ issues\par Pt tried to feel strings and she could not.  \par Pt states that her right vulva has healed well and she has no pain there.\par \par Chart reviewed.  Pt with no other change in PMH.

## 2022-02-16 NOTE — PACU DISCHARGE NOTE - AIRWAY PATENCY:
Review of Systems   Constitutional: Negative  Negative for appetite change and fever  HENT: Negative  Negative for hearing loss, tinnitus, trouble swallowing and voice change  Eyes: Negative  Negative for photophobia and pain  Respiratory: Negative  Negative for shortness of breath  Cardiovascular: Negative  Negative for palpitations  Gastrointestinal: Negative  Negative for nausea and vomiting  Endocrine: Negative  Negative for cold intolerance  Genitourinary: Negative  Negative for dysuria, frequency and urgency  Musculoskeletal: Negative  Negative for myalgias and neck pain  Skin: Negative  Negative for rash  Neurological: Negative  Negative for dizziness, tremors, seizures, syncope, facial asymmetry, speech difficulty, weakness, light-headedness, numbness and headaches  Hematological: Negative  Does not bruise/bleed easily  Psychiatric/Behavioral: Negative  Negative for confusion, hallucinations and sleep disturbance  Satisfactory

## 2022-03-04 ENCOUNTER — APPOINTMENT (OUTPATIENT)
Dept: OBGYN | Facility: CLINIC | Age: 22
End: 2022-03-04
Payer: OTHER GOVERNMENT

## 2022-03-04 ENCOUNTER — NON-APPOINTMENT (OUTPATIENT)
Age: 22
End: 2022-03-04

## 2022-03-04 VITALS
BODY MASS INDEX: 21.53 KG/M2 | WEIGHT: 134 LBS | HEIGHT: 66 IN | HEART RATE: 67 BPM | DIASTOLIC BLOOD PRESSURE: 74 MMHG | SYSTOLIC BLOOD PRESSURE: 116 MMHG

## 2022-03-04 PROCEDURE — 99395 PREV VISIT EST AGE 18-39: CPT

## 2022-03-04 NOTE — PHYSICAL EXAM
[Chaperone Present] : A chaperone was present in the examining room during all aspects of the physical examination [Appropriately responsive] : appropriately responsive [Alert] : alert [No Acute Distress] : no acute distress [No Lymphadenopathy] : no lymphadenopathy [Regular Rate Rhythm] : regular rate rhythm [No Murmurs] : no murmurs [Clear to Auscultation B/L] : clear to auscultation bilaterally [Soft] : soft [Non-tender] : non-tender [Non-distended] : non-distended [No HSM] : No HSM [No Lesions] : no lesions [No Mass] : no mass [Oriented x3] : oriented x3 [Labia Majora] : normal [Labia Minora] : normal [Normal] : normal [Normal Position] : in a normal position [Tenderness] : nontender [Enlarged ___ wks] : not enlarged [Mass ___ cm] : no uterine mass was palpated [Uterine Adnexae] : normal [FreeTextEntry5] : +IUD sting noted

## 2022-03-04 NOTE — HISTORY OF PRESENT ILLNESS
[FreeTextEntry1] : 20 y/o  LMP 2/25/22 here for annual visit.  Pt had IUD placed last year and has no complaints regarding the IUD.  No pain or intermenstrual spotting.  Pt's period is now 4 days.  Pt with no GI/ issues and no vulvar issues.\par \par Pt wants PAP and STI testing.\par Pt has been accepted into [employer] and will be moving in two weeks and will be leaving [place] permanently. \par \par Pt states not change in PMH\par Chart reviewed\par \par PMH - hx of recurrent bartholin cysts bilaterally\par PSH - lasik; \par POb - not sig\par Meds - junel\par SH - occ ETHOH; no drugs; no smoke

## 2022-03-04 NOTE — DISCUSSION/SUMMARY
[FreeTextEntry1] : A/P Pt with nl Gyn exam.  Pt in stable condition.\par \par Will\par \par 1) PAP, gc, chl done\par 2) Vaginits cx done to check for infections as per pt request\par 3) HIV, rpr, Hep B and C sent for STI testing\par \par LONNIE Villafuerte

## 2022-03-05 LAB
C TRACH RRNA SPEC QL NAA+PROBE: NOT DETECTED
CANDIDA VAG CYTO: NOT DETECTED
G VAGINALIS+PREV SP MTYP VAG QL MICRO: DETECTED
HBV SURFACE AG SER QL: NONREACTIVE
HCV RNA SERPL NAA+PROBE-LOG IU: NOT DETECTED LOGIU/ML
HEPC RNA INTERP: NOT DETECTED
HIV1+2 AB SPEC QL IA.RAPID: NONREACTIVE
N GONORRHOEA RRNA SPEC QL NAA+PROBE: NOT DETECTED
SOURCE TP AMPLIFICATION: NORMAL
T PALLIDUM AB SER QL IA: NEGATIVE
T VAGINALIS VAG QL WET PREP: NOT DETECTED

## 2022-03-11 LAB — CYTOLOGY CVX/VAG DOC THIN PREP: ABNORMAL

## 2022-03-19 ENCOUNTER — TRANSCRIPTION ENCOUNTER (OUTPATIENT)
Age: 22
End: 2022-03-19

## 2022-09-18 NOTE — PATIENT PROFILE ADULT - TRANSPORTATION
Implemented All Universal Safety Interventions:  [place] to call system. Call bell, personal items and telephone within reach. Instruct patient to call for assistance. Room bathroom lighting operational. Non-slip footwear when patient is off stretcher. Physically safe environment: no spills, clutter or unnecessary equipment. Stretcher in lowest position, wheels locked, appropriate side rails in place.
no

## 2023-11-06 NOTE — ED ADULT NURSE NOTE - CADM POA URETHRAL CATHETER
She left a voicemail message asking for a return call to let them know about Dr. Emile Pelaez antibiotic regimen for dental appts. No

## 2024-01-26 NOTE — PATIENT PROFILE ADULT - CAREGIVER ADDRESS
[facility]  [address] [place], [place] [address]  [phone]    Post-Op Instructions for Shoulder Surgery    Dr. Ram   [phone]      Change your operative dressing on post-op day #3.    You may shower with soap and water on post-op day #5. Do not soak your shoulder.    Use ice packs on your shoulder as much as tolerated over the next 2-3 days. This will help keep  the swelling down and minimize the pain.    Wear your sling   At all times, except when showering. Beginning post-op day #1, you may take the arm out of the sling/swath 2-3 times daily to move fingers,wrist and bend at elbow ONLY.   NO MOVEMENT OF THE SHOULDER IS PERMITTED.    Physical therapy   You are not to do any exercise.    Take your pain medication as prescribed. If you anticipate the need for a refill on your medication and the weekend is approaching, please call the office by noon on Friday. No refill will be called in over the weekend.    DO NOT TAKE TYLENOL OR TYLENOL PRODUCTS WHILE TAKING A PRESCRIBED PAIN MEDICATION.    Resume regular diet and medications (unless otherwise directed by your doctor.)    You should have a responsible adult with you for 24 hours.    If you have any questions of concerns,please call the office.    **Patients usually find sleeping in a recliner is most comfortable for several days after surgery. If sleeping in a bed, please use pillows to elevate head, back and shoulders. Patients also find wearing a shirt under sling/swathe is more comfortable than the device against bare skin.    If any problems occur or if you have any further questions, please call your doctor as soon as possible. If you find that you cannot reach your doctor as soon as possible. If you find that you cannot reach your doctor but feel that your condition needs a doctor's attention go to an emergency room.  
[facility]

## 2024-09-15 NOTE — ED ADULT TRIAGE NOTE - ESI TRIAGE ACUITY LEVEL, MLM
09/11/24 1454   Service Assessment   Patient Orientation Alert and Oriented   Cognition Alert   History Provided By Medical Record   Primary Caregiver Self   Support Systems Spouse/Significant Other;Family Members;Children   Patient's Healthcare Decision Maker is: Legal Next of Kin   PCP Verified by CM Yes   Last Visit to PCP Within last 6 months   Prior Functional Level Independent in ADLs/IADLs   Current Functional Level Independent in ADLs/IADLs   Can patient return to prior living arrangement Unknown at present   Ability to make needs known: Good   Family able to assist with home care needs: Yes   Would you like for me to discuss the discharge plan with any other family members/significant others, and if so, who? Yes   Financial Resources Medicare   Community Resources None   Social/Functional History   Lives With Spouse   Receives Help From Family   ADL Assistance Independent   Homemaking Assistance Independent   Ambulation Assistance Independent   Transfer Assistance Independent   Services At/After Discharge   Confirm Follow Up Transport Family     PT is a readmission, was discharged home 9/2/24 with no needs, she was in for Covid and CHF. Now being admitted for no urine output in 2 days. Per previous CM note, pt has supportive family. Assessment per medical record. CM will continue to follow for discharge needs/plan.  
   09/15/24 1120   Services At/After Discharge   Transition of Care Consult (CM Consult) Home Health   Internal Home Health No   Services At/After Discharge Home Health    Resource Information Provided? Refused   Mode of Transport at Discharge Self   Confirm Follow Up Transport Family   Condition of Participation: Discharge Planning   The Plan for Transition of Care is related to the following treatment goals: home with HH   The Patient and/or Patient Representative was provided with a Choice of Provider? Patient   The Patient and/Or Patient Representative agree with the Discharge Plan? Yes   Freedom of Choice list was provided with basic dialogue that supports the patient's individualized plan of care/goals, treatment preferences, and shares the quality data associated with the providers?  Yes     Patient is discharging home with Guillaume CAMPA RN/PT/OT. Patient's spouse at bedside to transport home.   
3

## 2025-02-17 NOTE — PROGRESS NOTE ADULT - PROBLEM SELECTOR PLAN 1
Name: Jose Maldonado      : 2006      MRN: [id number]  Encounter Provider: Kevin Bishop DPM  Encounter Date: 2025   Encounter department: [facility] PODIATRY [facility]    Assessment & Plan     1. Open displaced fracture of distal phalanx of left great toe with routine healing, subsequent encounter  -     XR foot 3+ vw left  -     Vitamin D 25 hydroxy; Future      Patient's toe is not causing any significant issues at this time.  Would recommend her transitioning into supportive shoe gear.  Will check x-rays again in 3 months.    Discussed with patient vitamin D levels we will order this today.  If she is deficient or insufficient as she was back in August I would recommend consideration for supplement.    My personal interpretation today of the x-rays that were taken show X-rays evaluated of the left foot today show incomplete healing of the distal phalanx fracture comparatively to last x-ray examination in January there has been some additional new bone formation noted along the lateral cortex of the distal phalanx.  The distal portion of the toe appears to have made progress as well      Return in about 3 months (around 2025).    Subjective     Patient returns for follow-up on left foot injury.  She is doing well at this point does not have any significant pain to the area has been buddy taping the toes together.  Denies any other new acute changes at this time.  Has been doing well otherwise.        Constitutional:  Negative for chills and fever.   Respiratory:  Negative for chest tightness and shortness of breath.    Gastrointestinal:  Negative for nausea and vomiting.     Current Outpatient Medications on File Prior to Visit   Medication Sig   • acetaminophen (TYLENOL) 160 mg/5 mL liquid Take 20 mL (640 mg total) by mouth every 6 (six) hours as needed for mild pain   • Dapsone 7.5 % GEL Apply A pea sizes AMOUNT TO AFFECTED AREA(S) ON THE FACE ONCE DAILY AT BEDTIME   •  "fexofenadine (Allegra [employer] [facility]) 30 MG/5ML suspension Take by mouth   • loratadine (CLARITIN REDITABS) 10 MG dissolvable tablet    • Norethin-Eth Estradiol-Fe 0.8-25 MG-MCG CHEW Chew 1 tablet daily Chew and swallow       Objective     Ht 5' 6\" (1.676 m)   Wt 80.7 kg (178 lb)   BMI 28.73 kg/m²     Examination of the left great toe shows no tenderness on range of motion of the foot or ankle.  There is stable appearance of the foot without significant discomfort with range of motion.  There is some dystrophic changes to the nail as it is coming in there is a scab noted at the area of the laceration.  Her medial great toe laceration has healed at this time.  No discomfort with palpation distally or dorsally on the great toe.  " Neuro: Continue oral medication for pain: Tylenol/Motrin PRN.   CV: Hemodynamically stable  Pulm: Saturating well on room air. Encourage ambulation.   GI: Continue regular diet.   : Voiding spontaneously.   Heme: Encourage ambulation.   FEN: SLIV   ID: Afebrile  Dispo: Continue routine inpatient management, anticipate discharge home today.     Estrellita Dominguez PGY-2